# Patient Record
Sex: FEMALE | Race: WHITE | Employment: OTHER | URBAN - METROPOLITAN AREA
[De-identification: names, ages, dates, MRNs, and addresses within clinical notes are randomized per-mention and may not be internally consistent; named-entity substitution may affect disease eponyms.]

---

## 2017-12-21 ENCOUNTER — HOSPITAL ENCOUNTER (EMERGENCY)
Age: 67
Discharge: HOME | End: 2017-12-21
Attending: EMERGENCY MEDICINE | Admitting: EMERGENCY MEDICINE
Payer: MEDICARE

## 2017-12-21 VITALS
WEIGHT: 124 LBS | OXYGEN SATURATION: 98 % | HEIGHT: 65 IN | RESPIRATION RATE: 18 BRPM | DIASTOLIC BLOOD PRESSURE: 92 MMHG | HEART RATE: 94 BPM | SYSTOLIC BLOOD PRESSURE: 188 MMHG | TEMPERATURE: 97.6 F | BODY MASS INDEX: 20.66 KG/M2

## 2017-12-21 DIAGNOSIS — S09.90XA INJURY OF HEAD, INITIAL ENCOUNTER: Primary | ICD-10-CM

## 2017-12-21 PROCEDURE — 99283 EMERGENCY DEPT VISIT LOW MDM: CPT

## 2017-12-21 RX ORDER — METOPROLOL TARTRATE 25 MG/1
25 TABLET, FILM COATED ORAL DAILY
COMMUNITY

## 2017-12-21 RX ORDER — LEVOTHYROXINE SODIUM 75 UG/1
75 TABLET ORAL
COMMUNITY

## 2017-12-21 RX ORDER — SIMVASTATIN 20 MG/1
20 TABLET, FILM COATED ORAL
COMMUNITY

## 2017-12-21 RX ORDER — NORETHINDRONE ACETATE AND ETHINYL ESTRADIOL 1; 5 MG/1; UG/1
0.5 TABLET ORAL DAILY
COMMUNITY

## 2017-12-21 NOTE — ED PROVIDER NOTES
HPI Comments: No headache since injury 1 week ago. Headache started with physical activity yesterday. Pain was relieved by NSAIDs. Also complains of pain behind her eye which resolved with NSAIDS. Denies vision changes, dizziness, N/V or LOC. Patient is a 78 y.o. female presenting with head injury. The history is provided by the patient. Head Injury    The incident occurred more than 2 days ago. The injury mechanism was a direct blow. The volume of blood lost was none. The pain is mild. Pertinent negatives include no numbness, no blurred vision, no vomiting, no disorientation, no weakness and no memory loss. She has tried NSAID for the symptoms. The treatment provided moderate relief. There was no loss of consciousness. Past Medical History:   Diagnosis Date    Cancer [facility])     melanoma    Endocrine disease     hypothyroid       Past Surgical History:   Procedure Laterality Date    CARDIAC SURG PROCEDURE UNLIST      cardiac ablation         History reviewed. No pertinent family history. Social History     Social History    Marital status:      Spouse name: N/A    Number of children: N/A    Years of education: N/A     Occupational History    Not on file. Social History Main Topics    Smoking status: Never Smoker    Smokeless tobacco: Not on file    Alcohol use No    Drug use: Not on file    Sexual activity: Not on file     Other Topics Concern    Not on file     Social History Narrative    No narrative on file         ALLERGIES: Keflex [cephalexin]    Review of Systems   Constitutional: Negative for chills and fever. HENT: Negative for congestion. Eyes: Negative for blurred vision, photophobia, pain, discharge, redness and visual disturbance. Respiratory: Negative for cough, choking, shortness of breath and wheezing. Cardiovascular: Negative for chest pain. Gastrointestinal: Negative for abdominal pain, constipation, diarrhea, nausea and vomiting.    Neurological: Positive for headaches. Negative for dizziness, seizures, syncope, facial asymmetry, speech difficulty, weakness, light-headedness and numbness. Psychiatric/Behavioral: Negative for confusion, decreased concentration, hallucinations and memory loss. Vitals:    12/21/17 0911 12/21/17 0948   BP: (!) 187/96 (!) 188/92   Pulse: (!) 103 94   Resp: 20 18   Temp: 97.6 °F (36.4 °C)    SpO2: 100% 98%   Weight: 56.2 kg (124 lb)    Height: 5' 4.5\" (1.638 m)             Physical Exam   Constitutional: She appears well-developed and well-nourished. HENT:   Head: Normocephalic. Mouth/Throat: No oropharyngeal exudate. Eyes: Conjunctivae are normal. Pupils are equal, round, and reactive to light. Right eye exhibits no discharge. Left eye exhibits no discharge. Cardiovascular: Regular rhythm. Exam reveals no gallop and no friction rub. No murmur heard. Pulmonary/Chest: Effort normal and breath sounds normal. No respiratory distress. She has no wheezes. She has no rales. She exhibits no tenderness. MDM  Number of Diagnoses or Management Options  Injury of head, initial encounter:     ED Course       Procedures    A: 78 y.o. female presenting with head injury, possible concussion. Discussed CT scan with the Pt. Explained that there are no concerning symptoms such as LOC, fatigue, N/V, dizziness, focal neurological deficits. Explained that physical activity most likely caused the headache from increased blood flow. CT scan at this time would not  and would not show concussion.        Plan:  - Advised OTC Acetaminophen for headaches  - Advised ice packs for swelling  - Advised to return to ER if symptoms worsen  - Advised to check BP at home and f/u with PCP regarding elevated BP in the ER

## 2017-12-21 NOTE — DISCHARGE INSTRUCTIONS
TYLENOL AS NEEDED FOR HEADACHES  ICE PACKS ON AND OFF TO DECREASE INFLAMMATION AND SWELLING     Learning About a Closed Head Injury  What is a closed head injury? A closed head injury happens when your head gets hit hard. The strong force of the blow causes your brain to shake in your skull. This movement can cause the brain to bruise, swell, or tear. Sometimes nerves or blood vessels also get damaged. This can cause bleeding in or around the brain. A concussion is a type of closed head injury. What are the symptoms? If you have a mild concussion, you may have a mild headache or feel \"not quite right. \" These symptoms are common. They usually go away over a few days to 4 weeks. But sometimes after a concussion, you feel like you can't function as well as before the injury. And you have new symptoms. This is called postconcussive syndrome. You may:  · Find it harder to solve problems, think, concentrate, or remember. · Have headaches. · Have changes in your sleep patterns, such as not being able to sleep or sleeping all the time. · Have changes in your personality. · Not be interested in your usual activities. · Feel angry or anxious without a clear reason. · Lose your sense of taste or smell. · Be dizzy, lightheaded, or unsteady. It may be hard to stand or walk. How is a closed head injury treated? Any person who may have a concussion needs to see a doctor. Some people have to stay in the hospital to be watched. Others can go home safely. If you go home, follow your doctor's instructions. He or she will tell you if you need someone to watch you closely for the next 24 hours or longer. Rest is the best treatment. Get plenty of sleep at night. And try to rest during the day. · Avoid activities that are physically or mentally demanding. These include housework, exercise, and schoolwork. And don't play video games, send text messages, or use the computer.  You may need to change your school or work schedule to be able to avoid these activities. · Ask your doctor when it's okay to drive, ride a bike, or operate machinery. · Take an over-the-counter pain medicine, such as acetaminophen (Tylenol), ibuprofen (Advil, Motrin), or naproxen (Aleve). Be safe with medicines. Read and follow all instructions on the label. · Check with your doctor before you use any other medicines for pain. · Do not drink alcohol or use illegal drugs. They can slow recovery. They can also increase your risk of getting a second head injury. Follow-up care is a key part of your treatment and safety. Be sure to make and go to all appointments, and call your doctor if you are having problems. It's also a good idea to know your test results and keep a list of the medicines you take. Where can you learn more? Go to http://jodiGermmattersspencer.info/. Enter E235 in the search box to learn more about \"Learning About a Closed Head Injury. \"  Current as of: October 14, 2016  Content Version: 11.4  © 4111-7648 [employer]. Care instructions adapted under license by [employer] (which disclaims liability or warranty for this information). If you have questions about a medical condition or this instruction, always ask your healthcare professional. [place] [address] any warranty or liability for your use of this information.      Concussion: Care Instructions  Your Care Instructions    A concussion is a kind of injury to the brain. It happens when the head receives a hard blow. The impact can jar or shake the brain against the skull. This interrupts the brain's normal activities. Although you may have cuts or bruises on your head or face, you may have no other visible signs of a brain injury. In most cases, damage to the brain from a concussion can't be seen in tests such as a CT or MRI scan.   For a few weeks, you may have low energy, dizziness, trouble sleeping, a headache, ringing in your ears, or nausea. You may also feel anxious, grumpy, or depressed. You may have problems with memory and concentration. These symptoms are common after a concussion. They should slowly improve over time. Sometimes this takes weeks or even months. Someone who lives with you should know how to care for you. Please share this and all information with a caregiver who will be available to help if needed. Follow-up care is a key part of your treatment and safety. Be sure to make and go to all appointments, and call your doctor if you are having problems. It's also a good idea to know your test results and keep a list of the medicines you take. How can you care for yourself at home? Pain control  · Put ice or a cold pack on the part of your head that hurts for 10 to 20 minutes at a time. Put a thin cloth between the ice and your skin. · Be safe with medicines. Read and follow all instructions on the label. ¨ If the doctor gave you a prescription medicine for pain, take it as prescribed. ¨ If you are not taking a prescription pain medicine, ask your doctor if you can take an over-the-counter medicine. Recovery  · Follow your doctor's instructions. He or she will tell you if you need someone to watch you closely for the next 24 hours or longer. · Rest is the best way to recover from a concussion. You need to rest your body and your brain:  ¨ Get plenty of sleep at night. And take rest breaks during the day. ¨ Avoid activities that take a lot of physical or mental work. This includes housework, exercise, schoolwork, video games, text messaging, and using the computer. ¨ You may need to change your school or work schedule while you recover. ¨ Return to your normal activities slowly. Do not try to do too much at once. · Do not drink alcohol or use illegal drugs. Alcohol and illegal drugs can slow your recovery. And they can increase your risk of a second brain injury.   · Avoid activities that could lead to another concussion. Follow your doctor's instructions for a gradual return to activity and sports. · Ask your doctor when it's okay for you to drive a car, ride a bike, or operate machinery. How should you return to activity? Your return to sports or activity should be gradual. It should only begin when all symptoms of a concussion are gone, both while at rest and during exercise or exertion. Doctors and concussion specialists suggest steps to follow for returning to sports after a concussion. Use these steps as a guide. You should slowly progress through the following levels of activity:  1. No activity. This means complete physical and mental rest.  2. Light aerobic activity. This can include walking, swimming, or other exercise at less than 70% of maximum heart rate. No resistance training is included in this step. 3. Sport-specific exercise. This includes running drills or skating drills (depending on the sport), but no head impact. 4. Noncontact training drills. This includes more complex training drills such as passing. The athlete may also begin light resistance training. 5. Full-contact practice. The athlete can participate in normal training. 6. Return to normal game play. This is the final step and allows the athlete to join in normal game play. Watch and keep track of your progress. It should take at least 6 days for you to go from light activity to normal game play. Make sure that you can stay at each new level of activity for at least 24 hours without symptoms, or as long as your doctor says, before doing more. If one or more symptoms come back, return to a lower level of activity for at least 24 hours. Don't move on until all symptoms are gone. When should you call for help? Call 911 anytime you think you may need emergency care. For example, call if:  ? · You have a seizure. ? · You passed out (lost consciousness). ? · You are confused or can't stay awake.    ?Call your doctor now or seek immediate medical care if:  ? · You have new or worse vomiting. ? · You feel less alert. ? · You have new weakness or numbness in any part of your body. ? Watch closely for changes in your health, and be sure to contact your doctor if:  ? · You do not get better as expected. ? · You have new symptoms, such as headaches, trouble concentrating, or changes in mood. Where can you learn more? Go to http://jodi-spencer.info/. Enter W117 in the search box to learn more about \"Concussion: Care Instructions. \"  Current as of: October 14, 2016  Content Version: 11.4  © 5112-8889 [employer]. Care instructions adapted under license by [employer] (which disclaims liability or warranty for this information). If you have questions about a medical condition or this instruction, always ask your healthcare professional. [address] any warranty or liability for your use of this information.

## 2022-04-26 ENCOUNTER — TRANSCRIBE ORDER (OUTPATIENT)
Dept: SCHEDULING | Age: 72
End: 2022-04-26

## 2022-04-26 DIAGNOSIS — R10.11 ABDOMINAL PAIN, RIGHT UPPER QUADRANT: Primary | ICD-10-CM

## 2022-05-02 ENCOUNTER — HOSPITAL ENCOUNTER (OUTPATIENT)
Dept: ULTRASOUND IMAGING | Age: 72
Discharge: HOME | End: 2022-05-02
Payer: MEDICARE

## 2022-05-02 DIAGNOSIS — R10.11 ABDOMINAL PAIN, RIGHT UPPER QUADRANT: ICD-10-CM

## 2022-05-02 PROCEDURE — 76700 US EXAM ABDOM COMPLETE: CPT
